# Patient Record
Sex: FEMALE | Race: WHITE | NOT HISPANIC OR LATINO | Employment: OTHER | URBAN - METROPOLITAN AREA
[De-identification: names, ages, dates, MRNs, and addresses within clinical notes are randomized per-mention and may not be internally consistent; named-entity substitution may affect disease eponyms.]

---

## 2022-01-23 ENCOUNTER — NURSE TRIAGE (OUTPATIENT)
Dept: ADMINISTRATIVE | Facility: CLINIC | Age: 52
End: 2022-01-23

## 2022-01-23 NOTE — TELEPHONE ENCOUNTER
Placed call to pt listed contact number. VM is not set up, will make second attempt in 15min.     Pt request info regarding [facility] infusion - Nt able to assist and provide infusion scheduling telephone number to pt. Denies any need for triage at this time.     Reason for Disposition   [1] Follow-up call to recent contact AND [2] information only call, no triage required    Protocols used: INFORMATION ONLY CALL - NO TRIAGE-A-

## 2023-05-27 PROBLEM — R56.9 SEIZURE: Status: ACTIVE | Noted: 2023-05-27

## 2023-08-04 ENCOUNTER — NURSE TRIAGE (OUTPATIENT)
Dept: ADMINISTRATIVE | Facility: CLINIC | Age: 53
End: 2023-08-04

## 2023-08-04 NOTE — TELEPHONE ENCOUNTER
Spoke with mr. Hines (significant other) regarding pt. States that she is having severe upper abdominal pain. States pt has been vomiting, and unable to keep anything down. States pt was seen in ED recently, as was told to be seen by GI provider, but not given referral. Pt advised to be seen in ED now. Caller asking if 's office could call pt, or him [phone]      Reason for Disposition   SEVERE abdominal pain (e.g., excruciating)    Additional Information   Negative: Passed out (i.e., fainted, collapsed and was not responding)   Negative: Shock suspected (e.g., cold/pale/clammy skin, too weak to stand, low BP, rapid pulse)   Negative: Visible sweat on face or sweat is dripping down    Protocols used: Abdominal Pain - Upper-A-[place]

## 2023-11-01 PROBLEM — D64.9 ANEMIA: Status: ACTIVE | Noted: 2023-11-01

## 2023-11-01 PROBLEM — K63.5 POLYP OF COLON: Status: ACTIVE | Noted: 2023-11-01

## 2023-11-01 PROBLEM — R10.32 LLQ PAIN: Status: ACTIVE | Noted: 2023-11-01

## 2023-11-01 PROBLEM — R63.4 WEIGHT LOSS: Status: ACTIVE | Noted: 2023-11-01

## 2023-11-08 PROBLEM — K80.20 CHOLELITHIASIS WITHOUT CHOLECYSTITIS: Status: ACTIVE | Noted: 2023-11-08

## 2024-10-01 ENCOUNTER — PATIENT OUTREACH (OUTPATIENT)
Dept: ADMINISTRATIVE | Facility: HOSPITAL | Age: 54
End: 2024-10-01

## 2024-10-02 PROBLEM — G47.00 INSOMNIA: Status: ACTIVE | Noted: 2024-10-02

## 2024-10-02 PROBLEM — I10 ESSENTIAL HYPERTENSION: Status: ACTIVE | Noted: 2024-10-02

## 2024-10-02 PROBLEM — K21.00 GASTROESOPHAGEAL REFLUX DISEASE WITH ESOPHAGITIS WITHOUT HEMORRHAGE: Status: ACTIVE | Noted: 2024-10-02

## 2024-10-02 PROBLEM — M54.9 DORSALGIA, UNSPECIFIED: Status: ACTIVE | Noted: 2024-10-02

## 2024-10-02 PROBLEM — J44.9 CHRONIC OBSTRUCTIVE PULMONARY DISEASE: Status: ACTIVE | Noted: 2024-10-02

## 2024-10-21 ENCOUNTER — PATIENT OUTREACH (OUTPATIENT)
Dept: ADMINISTRATIVE | Facility: HOSPITAL | Age: 54
End: 2024-10-21

## 2024-10-21 DIAGNOSIS — Z12.31 BREAST CANCER SCREENING BY MAMMOGRAM: Primary | ICD-10-CM
